# Patient Record
Sex: MALE | Race: WHITE | Employment: FULL TIME | URBAN - METROPOLITAN AREA
[De-identification: names, ages, dates, MRNs, and addresses within clinical notes are randomized per-mention and may not be internally consistent; named-entity substitution may affect disease eponyms.]

---

## 2017-01-07 PROCEDURE — 84550 ASSAY OF BLOOD/URIC ACID: CPT | Performed by: FAMILY MEDICINE

## 2017-01-07 PROCEDURE — 36415 COLL VENOUS BLD VENIPUNCTURE: CPT | Performed by: FAMILY MEDICINE

## 2017-01-07 PROCEDURE — 80061 LIPID PANEL: CPT | Performed by: FAMILY MEDICINE

## 2017-01-07 PROCEDURE — 80050 GENERAL HEALTH PANEL: CPT | Performed by: FAMILY MEDICINE

## 2018-06-01 ENCOUNTER — OFFICE VISIT (OUTPATIENT)
Dept: FAMILY MEDICINE CLINIC | Facility: CLINIC | Age: 38
End: 2018-06-01

## 2018-06-01 VITALS
HEART RATE: 112 BPM | RESPIRATION RATE: 16 BRPM | TEMPERATURE: 99 F | WEIGHT: 293 LBS | OXYGEN SATURATION: 98 % | DIASTOLIC BLOOD PRESSURE: 90 MMHG | SYSTOLIC BLOOD PRESSURE: 132 MMHG | BODY MASS INDEX: 38.01 KG/M2 | HEIGHT: 73.5 IN

## 2018-06-01 DIAGNOSIS — R39.9 UTI SYMPTOMS: Primary | ICD-10-CM

## 2018-06-01 PROCEDURE — 87086 URINE CULTURE/COLONY COUNT: CPT | Performed by: NURSE PRACTITIONER

## 2018-06-01 PROCEDURE — 99202 OFFICE O/P NEW SF 15 MIN: CPT | Performed by: NURSE PRACTITIONER

## 2018-06-01 PROCEDURE — 81003 URINALYSIS AUTO W/O SCOPE: CPT | Performed by: NURSE PRACTITIONER

## 2018-06-01 RX ORDER — SULFAMETHOXAZOLE AND TRIMETHOPRIM 800; 160 MG/1; MG/1
1 TABLET ORAL 2 TIMES DAILY
Qty: 20 TABLET | Refills: 0 | Status: SHIPPED | OUTPATIENT
Start: 2018-06-01 | End: 2018-06-11

## 2018-06-01 NOTE — PROGRESS NOTES
CHIEF COMPLAINT:   Patient presents with:  Urinary: pain and pressure, 2 days. HPI:   Elodia Morgan is a 42year old male who presents with symptoms of UTI. Pt c/o moderate pain to bladder with urinary frequency and urgency. no dysuria.   Symptoms b LUNGS: clear to ausculation bilaterally, no wheezing or rhonchi  GI: BS present x 4. No hepatosplenomegaly. : mild suprapubic tenderness.  No bladder distention, or CVAT   PSYCH: pleasant mood and affect  NEURO: no focal deficits      Recent Results (fr Bacterial Prostatitis  The prostate gland is part of the male reproductive system. The gland sits just below the bladder. It surrounds the urethra, the tube that carries urine and semen out of the body.  Bacterial prostatitis is an infection of the prostate © 4383-2710 [employer] [address] [facility] [place] [facility] [place]. All rights reserved. This information is not intended as a substitute for professional medical care. Always follow your healthcare professional's instructions.

## 2018-06-01 NOTE — PATIENT INSTRUCTIONS
Bacterial Prostatitis  The prostate gland is part of the male reproductive system. The gland sits just below the bladder. It surrounds the urethra, the tube that carries urine and semen out of the body.  Bacterial prostatitis is an infection of the prosta © 4027-4644 [employer] [address]. [address] [facility] [place], [facility] [place]. All rights reserved. This information is not intended as a substitute for professional medical care. Always follow your healthcare professional's instructions.

## 2018-07-19 ENCOUNTER — CHARTING TRANS (OUTPATIENT)
Dept: OTHER | Age: 38
End: 2018-07-19

## 2018-07-19 ASSESSMENT — PAIN SCALES - GENERAL: PAINLEVEL_OUTOF10: 1

## 2018-10-31 VITALS
HEIGHT: 74 IN | WEIGHT: 280 LBS | DIASTOLIC BLOOD PRESSURE: 70 MMHG | TEMPERATURE: 98.5 F | HEART RATE: 93 BPM | SYSTOLIC BLOOD PRESSURE: 110 MMHG | RESPIRATION RATE: 18 BRPM | BODY MASS INDEX: 35.94 KG/M2

## 2019-04-04 PROBLEM — E66.01 SEVERE OBESITY (BMI 35.0-39.9) WITH COMORBIDITY (HCC): Status: ACTIVE | Noted: 2019-04-04

## 2021-04-09 DIAGNOSIS — Z23 NEED FOR VACCINATION: ICD-10-CM

## 2021-08-13 PROBLEM — E66.01 SEVERE OBESITY (BMI 35.0-39.9) WITH COMORBIDITY (HCC): Status: RESOLVED | Noted: 2019-04-04 | Resolved: 2021-08-13

## 2024-06-13 ENCOUNTER — OFFICE VISIT (OUTPATIENT)
Dept: FAMILY MEDICINE CLINIC | Facility: CLINIC | Age: 44
End: 2024-06-13
Payer: COMMERCIAL

## 2024-06-13 ENCOUNTER — LAB ENCOUNTER (OUTPATIENT)
Dept: LAB | Age: 44
End: 2024-06-13
Attending: FAMILY MEDICINE
Payer: COMMERCIAL

## 2024-06-13 VITALS
HEART RATE: 88 BPM | SYSTOLIC BLOOD PRESSURE: 135 MMHG | BODY MASS INDEX: 32.73 KG/M2 | RESPIRATION RATE: 16 BRPM | HEIGHT: 74 IN | DIASTOLIC BLOOD PRESSURE: 89 MMHG | OXYGEN SATURATION: 98 % | WEIGHT: 255 LBS

## 2024-06-13 DIAGNOSIS — Z12.5 SCREENING FOR PROSTATE CANCER: ICD-10-CM

## 2024-06-13 DIAGNOSIS — I10 ESSENTIAL HYPERTENSION: ICD-10-CM

## 2024-06-13 DIAGNOSIS — Z12.11 SCREENING FOR COLON CANCER: ICD-10-CM

## 2024-06-13 DIAGNOSIS — M10.09 IDIOPATHIC GOUT OF MULTIPLE SITES, UNSPECIFIED CHRONICITY: ICD-10-CM

## 2024-06-13 DIAGNOSIS — Z00.00 LABORATORY EXAM ORDERED AS PART OF ROUTINE GENERAL MEDICAL EXAMINATION: ICD-10-CM

## 2024-06-13 DIAGNOSIS — Z00.00 WELLNESS EXAMINATION: Primary | ICD-10-CM

## 2024-06-13 LAB
ALBUMIN SERPL-MCNC: 4.4 G/DL (ref 3.4–5)
ALBUMIN/GLOB SERPL: 1.3 {RATIO} (ref 1–2)
ALP LIVER SERPL-CCNC: 59 U/L
ALT SERPL-CCNC: 51 U/L
ANION GAP SERPL CALC-SCNC: 6 MMOL/L (ref 0–18)
AST SERPL-CCNC: 25 U/L (ref 15–37)
BASOPHILS # BLD AUTO: 0.06 X10(3) UL (ref 0–0.2)
BASOPHILS NFR BLD AUTO: 1.1 %
BILIRUB SERPL-MCNC: 0.5 MG/DL (ref 0.1–2)
BUN BLD-MCNC: 12 MG/DL (ref 9–23)
CALCIUM BLD-MCNC: 9.3 MG/DL (ref 8.5–10.1)
CHLORIDE SERPL-SCNC: 112 MMOL/L (ref 98–112)
CHOLEST SERPL-MCNC: 174 MG/DL (ref ?–200)
CO2 SERPL-SCNC: 23 MMOL/L (ref 21–32)
COMPLEXED PSA SERPL-MCNC: 1.36 NG/ML (ref ?–4)
CREAT BLD-MCNC: 1.26 MG/DL
EGFRCR SERPLBLD CKD-EPI 2021: 72 ML/MIN/1.73M2 (ref 60–?)
EOSINOPHIL # BLD AUTO: 0.09 X10(3) UL (ref 0–0.7)
EOSINOPHIL NFR BLD AUTO: 1.7 %
ERYTHROCYTE [DISTWIDTH] IN BLOOD BY AUTOMATED COUNT: 13.5 %
FASTING PATIENT LIPID ANSWER: YES
FASTING STATUS PATIENT QL REPORTED: YES
GLOBULIN PLAS-MCNC: 3.5 G/DL (ref 2.8–4.4)
GLUCOSE BLD-MCNC: 126 MG/DL (ref 70–99)
HCT VFR BLD AUTO: 46.7 %
HDLC SERPL-MCNC: 48 MG/DL (ref 40–59)
HGB BLD-MCNC: 16.4 G/DL
IMM GRANULOCYTES # BLD AUTO: 0.01 X10(3) UL (ref 0–1)
IMM GRANULOCYTES NFR BLD: 0.2 %
LDLC SERPL CALC-MCNC: 111 MG/DL (ref ?–100)
LYMPHOCYTES # BLD AUTO: 1.77 X10(3) UL (ref 1–4)
LYMPHOCYTES NFR BLD AUTO: 33.4 %
MCH RBC QN AUTO: 31 PG (ref 26–34)
MCHC RBC AUTO-ENTMCNC: 35.1 G/DL (ref 31–37)
MCV RBC AUTO: 88.3 FL
MONOCYTES # BLD AUTO: 0.37 X10(3) UL (ref 0.1–1)
MONOCYTES NFR BLD AUTO: 7 %
NEUTROPHILS # BLD AUTO: 3 X10 (3) UL (ref 1.5–7.7)
NEUTROPHILS # BLD AUTO: 3 X10(3) UL (ref 1.5–7.7)
NEUTROPHILS NFR BLD AUTO: 56.6 %
NONHDLC SERPL-MCNC: 126 MG/DL (ref ?–130)
OSMOLALITY SERPL CALC.SUM OF ELEC: 293 MOSM/KG (ref 275–295)
PLATELET # BLD AUTO: 323 10(3)UL (ref 150–450)
POTASSIUM SERPL-SCNC: 3.9 MMOL/L (ref 3.5–5.1)
PROT SERPL-MCNC: 7.9 G/DL (ref 6.4–8.2)
RBC # BLD AUTO: 5.29 X10(6)UL
SODIUM SERPL-SCNC: 141 MMOL/L (ref 136–145)
TRIGL SERPL-MCNC: 80 MG/DL (ref 30–149)
TSI SER-ACNC: 1.37 MIU/ML (ref 0.36–3.74)
URATE SERPL-MCNC: 10 MG/DL
VIT D+METAB SERPL-MCNC: 24.7 NG/ML (ref 30–100)
VLDLC SERPL CALC-MCNC: 14 MG/DL (ref 0–30)
WBC # BLD AUTO: 5.3 X10(3) UL (ref 4–11)

## 2024-06-13 PROCEDURE — 3008F BODY MASS INDEX DOCD: CPT | Performed by: FAMILY MEDICINE

## 2024-06-13 PROCEDURE — 82306 VITAMIN D 25 HYDROXY: CPT | Performed by: FAMILY MEDICINE

## 2024-06-13 PROCEDURE — 80061 LIPID PANEL: CPT | Performed by: FAMILY MEDICINE

## 2024-06-13 PROCEDURE — 80050 GENERAL HEALTH PANEL: CPT | Performed by: FAMILY MEDICINE

## 2024-06-13 PROCEDURE — 84550 ASSAY OF BLOOD/URIC ACID: CPT | Performed by: FAMILY MEDICINE

## 2024-06-13 PROCEDURE — 3079F DIAST BP 80-89 MM HG: CPT | Performed by: FAMILY MEDICINE

## 2024-06-13 PROCEDURE — 3075F SYST BP GE 130 - 139MM HG: CPT | Performed by: FAMILY MEDICINE

## 2024-06-13 PROCEDURE — 99386 PREV VISIT NEW AGE 40-64: CPT | Performed by: FAMILY MEDICINE

## 2024-06-13 PROCEDURE — 83036 HEMOGLOBIN GLYCOSYLATED A1C: CPT | Performed by: FAMILY MEDICINE

## 2024-06-13 PROCEDURE — 84153 ASSAY OF PSA TOTAL: CPT | Performed by: FAMILY MEDICINE

## 2024-06-13 RX ORDER — LOSARTAN POTASSIUM 50 MG/1
50 TABLET ORAL DAILY
Qty: 90 TABLET | Refills: 0 | Status: SHIPPED | OUTPATIENT
Start: 2024-06-13

## 2024-06-13 RX ORDER — LOSARTAN POTASSIUM 100 MG/1
100 TABLET ORAL DAILY
Qty: 90 TABLET | Refills: 1 | Status: CANCELLED | OUTPATIENT
Start: 2024-06-13

## 2024-06-13 NOTE — PATIENT INSTRUCTIONS
Prevention Guidelines, Men Ages 40 to 49  Screening tests and vaccines are an important part of managing your health. A screening test is done to find possible disorders or diseases in people who don't have any symptoms. The goal is to find a disease early so lifestyle changes can be made and you can be watched more closely to reduce the risk of disease, or to detect it early enough to treat it most effectively. Screening tests are not considered diagnostic, but are used to determine if more testing is needed. Health counseling is essential, too. Below are guidelines for these, for men ages 40 to 49. Talk with your healthcare provider to make sure you’re up to date on what you need.  Screening Who needs it How often   Alcohol misuse All men in this age group At routine exams   Blood pressure All men in this age group Yearly checkup if your blood pressure reading is normal  Normal blood pressure is less than 120/80 mm Hg  If your blood pressure is higher than normal, follow the advice of your healthcare provider      Depression All men in this age group At routine exams   Type 2 diabetes or prediabetes All men beginning at age 45 and men  without symptoms at any age who are overweight or obese and have 1 or more other risk factors for diabetes At least every 3 years (yearly if blood sugar has begun to rise)   Type 2 diabetes All men with prediabetes Every year   Hepatitis C Men at increased risk for infection - talk with your healthcare provider At routine exams   High cholesterol or triglycerides All men ages 35 and older, and younger men at high risk for coronary artery disease At least every 5 years   HIV All men At routine exams   Obesity All men in this age group At routine exams   Prostate cancer Starting at age 45, talk to healthcare provider about risks and benefits of digital rectal exam (RINA) and prostate-specific antigen (PSA) screening1 At routine exams   Colorectal cancer Men at average risk 45 years  and older Multiple tests are available and are used at different times. Possible tests include:  Colonoscopy every 10 years, or  Flexible sigmoidoscopy every 5 years (or every 10 with yearly FIT stool test), or  CT colonography (virtual colonoscopy) every 5 years, or  Yearly fecal occult blood test, or  Yearly fecal immunochemical test (FIT) , or  Stool DNA test, every 1 to 3 years  You will need a follow-up with a colonoscopy if you choose a test other than a colonoscopy and have an abnormal test result. Talk with your healthcare provider about which tests are best for you.  Some people should be screened using a different schedule because of their personal or family health history. Talk with your healthcare provider about your health history.   Syphilis Men at increased risk for infection - talk with your healthcare provider At routine exams   Tuberculosis Men at increased risk for infection - talk with your healthcare provider Check with your healthcare provider   Vision All men in this age group Every 2 to 4 years if no risk factors for eye disease2   Vaccine Who needs it How often   Chickenpox (varicella) All men in this age group who have no record of this infection or vaccine 2 doses; the second dose should be given at least 4 weeks after the first dose   Hepatitis A Men at increased risk for infection - talk with your healthcare provider 2 doses given at least 6 months apart   Hepatitis B Men at increased risk for infection - talk with your healthcare provider 3 doses over 6 months; second dose should be given 1 month after the first dose; the third dose should be given at least 2 months after the second dose and at least 4 months after the first dose   Haemophilus influenzae Type B (HIB) Men at increased risk for infection - talk with your healthcare provider 1 to 3 doses   Influenza (flu) All men in this age group Once a year   Measles, mumps, rubella (MMR) All men in this age group who have no record of  these infections or vaccines 1 or 2 doses   Meningococcal Men at increased risk for infection - talk with your healthcare provider 1 or more doses   Pneumococcal conjugate vaccine (PCV13) and pneumococcal polysaccharide vaccine (PPSV23) Men at increased risk for infection - talk with your healthcare provider PCV13: 1 dose ages 19 to 65 (protects against 13 types of pneumococcal bacteria)     PPSV23: 1 to 2 doses through age 64, or 1 dose at 65 or older (protects against 23 types of pneumococcal bacteria)      Tetanus/diphtheria/  pertussis (Td/Tdap) booster All men in this age group Td every 10 years, or a one-time dose of Tdap instead of a Td booster after age 18, then Td every 10 years   Counseling Who needs it How often   Diet and exercise Men who are overweight or obese When diagnosed, and then at routine exams   Sexually transmitted infection prevention Men at increased risk for infection - talk with your healthcare provider At routine exams   Use of daily aspirin Men ages 45 to 79 at risk for cardiovascular health problems At routine exams   Use of tobacco and the health effects it can cause All men in this age group Every exam   [address] [place] [address] [facility]   [facility] [employer]  Brynn last reviewed this educational content on 2/1/2023 © 2000-2024 [employer]. All rights reserved. This information is not intended as a substitute for professional medical care. Always follow your healthcare professional's instructions.

## 2024-06-13 NOTE — PROGRESS NOTES
HPI:   Zeus Hall is a 43 year old male who presents for an Annual Health Visit.     H/o HTN - previously on losartan. Stopped medication a few years ago as his PCP retired & never established care with a new provider. He denies any symptoms. He follows a relatively healthy diet. He exercises regularly.    H/o gout - previously on allopurinol. No recent flares. Watches diet closely. Previously flares would attack his knees, ankles.     Allergies:   No Known Allergies    CURRENT MEDICATIONS   Current Outpatient Medications   Medication Sig Dispense Refill    losartan 50 MG Oral Tab Take 1 tablet (50 mg total) by mouth daily. 90 tablet 0      HISTORICAL INFORMATION   Past Medical History:    Essential hypertension    Gout      History reviewed. No pertinent surgical history.   Family History   Problem Relation Age of Onset    Diabetes Father     Other (Other) Sister         lupus      SOCIAL HISTORY   Social History     Socioeconomic History    Marital status:    Tobacco Use    Smoking status: Never    Smokeless tobacco: Never   Vaping Use    Vaping status: Never Used   Substance and Sexual Activity    Alcohol use: Yes     Alcohol/week: 0.0 standard drinks of alcohol     Comment: 1-2 drinks per week    Drug use: No    Sexual activity: Yes     Partners: Female   Other Topics Concern    Caffeine Concern Yes     Comment: 1-2 cups daily    Special Diet Yes     Comment: starting weight watchers    Exercise No    Seat Belt Yes     Social History     Social History Narrative    Not on file        REVIEW OF SYSTEMS:     Constitutional: negative  Eyes: negative  ENT: negative  Respiratory: negative  Cardiovascular: negative  Gastrointestinal: negative  Integument/Breast: negative  Genitourinary: negative  Heme/Lymph: negative  Musculoskeletal: negative  Neurological: negative  Psych: negative  Endocrine: negative  Allergic/Immune: negative    EXAM:   /89   Pulse 88   Resp 16   Ht 6' 2\" (1.88 m)   Wt 255  lb (115.7 kg)   SpO2 98%   BMI 32.74 kg/m²    Wt Readings from Last 6 Encounters:   06/13/24 255 lb (115.7 kg)   08/13/21 296 lb (134.3 kg)   08/11/20 272 lb 9.6 oz (123.7 kg)   04/04/19 287 lb 12.8 oz (130.5 kg)   06/01/18 293 lb (132.9 kg)   03/13/18 293 lb (132.9 kg)     Body mass index is 32.74 kg/m².    General: alert, appears stated age, cooperative, and mildly obese  Head: Normocephalic, without obvious abnormality, atraumatic  Eyes: negative  Ears: normal TM's and external ear canals both ears  Nose: Nares normal. Septum midline. Mucosa normal. No drainage or sinus tenderness.  Throat: lips, mucosa, and tongue normal; teeth and gums normal  Neck: no adenopathy, supple, symmetrical, trachea midline, and thyroid not enlarged, symmetric, no tenderness/mass/nodules  Heart: S1, S2 normal, no murmur, click, rub or gallop, regular rate and rhythm  Lungs: clear to auscultation bilaterally  Chest wall: no tenderness  Abdomen: soft, non-tender; bowel sounds normal; no masses,  no organomegaly  : deferred  Back: negative  Extremities: extremities normal, atraumatic, no cyanosis or edema  Pulses: 2+ and symmetric  Skin: Skin color, texture, turgor normal. No rashes or lesions  Lymph Nodes:  No LAD  Neurologic: Grossly normal    ASSESSMENT AND PLAN:   Zeus was seen today for establish care and wellness visit.    Diagnoses and all orders for this visit:    Wellness examination  -Immunizations: UTD  -Metabolic: BMI 32. /89. Due for annual labs   -Cancer screening: Due for PSA, CRC screening.   -Communicable disease: low risk   -Mental health: no concerns   -Other preventative: follow with dentistry and optometry.   -Lifestyle: Follow a well balanced healthy diet with emphasis on fruits, vegetables, whole grains, lean meats. Limit processed and junk foods. Aim for at least 150 minutes of moderate intensity exercise weekly. Make sure you are staying adequately hydrated. Aim to get 7-9 hours of sleep nightly.      Laboratory exam ordered as part of routine general medical examination  -     CBC With Differential With Platelet; Future  -     Comp Metabolic Panel (14); Future  -     Lipid Panel; Future  -     Vitamin D; Future  -     TSH W Reflex To Free T4; Future  -     PSA Total, Screen; Future    Screening for colon cancer  -     Surgery Referral - In Network    Screening for prostate cancer  -     PSA Total, Screen; Future    Essential hypertension  Restart losartan. Continue low sodium diet, regular exercise. Monitor BP at home, goal <140/90. F/u 4-6wks.   -     losartan 50 MG Oral Tab; Take 1 tablet (50 mg total) by mouth daily.    Idiopathic gout of multiple sites, unspecified chronicity  Check uric acid levels. Continue dietary modification.   -     Uric Acid; Future        Patient Instructions     Prevention Guidelines, Men Ages 40 to 49  Screening tests and vaccines are an important part of managing your health. A screening test is done to find possible disorders or diseases in people who don't have any symptoms. The goal is to find a disease early so lifestyle changes can be made and you can be watched more closely to reduce the risk of disease, or to detect it early enough to treat it most effectively. Screening tests are not considered diagnostic, but are used to determine if more testing is needed. Health counseling is essential, too. Below are guidelines for these, for men ages 40 to 49. Talk with your healthcare provider to make sure you’re up to date on what you need.  Screening Who needs it How often   Alcohol misuse All men in this age group At routine exams   Blood pressure All men in this age group Yearly checkup if your blood pressure reading is normal  Normal blood pressure is less than 120/80 mm Hg  If your blood pressure is higher than normal, follow the advice of your healthcare provider      Depression All men in this age group At routine exams   Type 2 diabetes or prediabetes All men beginning  at age 45 and men  without symptoms at any age who are overweight or obese and have 1 or more other risk factors for diabetes At least every 3 years (yearly if blood sugar has begun to rise)   Type 2 diabetes All men with prediabetes Every year   Hepatitis C Men at increased risk for infection - talk with your healthcare provider At routine exams   High cholesterol or triglycerides All men ages 35 and older, and younger men at high risk for coronary artery disease At least every 5 years   HIV All men At routine exams   Obesity All men in this age group At routine exams   Prostate cancer Starting at age 45, talk to healthcare provider about risks and benefits of digital rectal exam (RINA) and prostate-specific antigen (PSA) screening1 At routine exams   Colorectal cancer Men at average risk 45 years and older Multiple tests are available and are used at different times. Possible tests include:  Colonoscopy every 10 years, or  Flexible sigmoidoscopy every 5 years (or every 10 with yearly FIT stool test), or  CT colonography (virtual colonoscopy) every 5 years, or  Yearly fecal occult blood test, or  Yearly fecal immunochemical test (FIT) , or  Stool DNA test, every 1 to 3 years  You will need a follow-up with a colonoscopy if you choose a test other than a colonoscopy and have an abnormal test result. Talk with your healthcare provider about which tests are best for you.  Some people should be screened using a different schedule because of their personal or family health history. Talk with your healthcare provider about your health history.   Syphilis Men at increased risk for infection - talk with your healthcare provider At routine exams   Tuberculosis Men at increased risk for infection - talk with your healthcare provider Check with your healthcare provider   Vision All men in this age group Every 2 to 4 years if no risk factors for eye disease2   Vaccine Who needs it How often   Chickenpox (varicella) All men in  this age group who have no record of this infection or vaccine 2 doses; the second dose should be given at least 4 weeks after the first dose   Hepatitis A Men at increased risk for infection - talk with your healthcare provider 2 doses given at least 6 months apart   Hepatitis B Men at increased risk for infection - talk with your healthcare provider 3 doses over 6 months; second dose should be given 1 month after the first dose; the third dose should be given at least 2 months after the second dose and at least 4 months after the first dose   Haemophilus influenzae Type B (HIB) Men at increased risk for infection - talk with your healthcare provider 1 to 3 doses   Influenza (flu) All men in this age group Once a year   Measles, mumps, rubella (MMR) All men in this age group who have no record of these infections or vaccines 1 or 2 doses   Meningococcal Men at increased risk for infection - talk with your healthcare provider 1 or more doses   Pneumococcal conjugate vaccine (PCV13) and pneumococcal polysaccharide vaccine (PPSV23) Men at increased risk for infection - talk with your healthcare provider PCV13: 1 dose ages 19 to 65 (protects against 13 types of pneumococcal bacteria)     PPSV23: 1 to 2 doses through age 64, or 1 dose at 65 or older (protects against 23 types of pneumococcal bacteria)      Tetanus/diphtheria/  pertussis (Td/Tdap) booster All men in this age group Td every 10 years, or a one-time dose of Tdap instead of a Td booster after age 18, then Td every 10 years   Counseling Who needs it How often   Diet and exercise Men who are overweight or obese When diagnosed, and then at routine exams   Sexually transmitted infection prevention Men at increased risk for infection - talk with your healthcare provider At routine exams   Use of daily aspirin Men ages 45 to 79 at risk for cardiovascular health problems At routine exams   Use of tobacco and the health effects it can cause All men in this age group  Every exam   [facility]   2[facility] [employer]  Brynn last reviewed this educational content on 2/1/2023  © 8826-7662 [employer]. All rights reserved. This information is not intended as a substitute for professional medical care. Always follow your healthcare professional's instructions.          The patient indicates understanding of these issues and agrees to the plan.    Problem List:  Patient Active Problem List   Diagnosis    Idiopathic gout of multiple sites    Essential hypertension       Mir Treviño MD  6/13/2024  9:29 AM

## 2024-06-17 ENCOUNTER — TELEPHONE (OUTPATIENT)
Dept: FAMILY MEDICINE CLINIC | Facility: CLINIC | Age: 44
End: 2024-06-17

## 2024-06-17 DIAGNOSIS — R73.09 ELEVATED GLUCOSE: Primary | ICD-10-CM

## 2024-06-17 LAB
EST. AVERAGE GLUCOSE BLD GHB EST-MCNC: 131 MG/DL (ref 68–126)
HBA1C MFR BLD: 6.2 % (ref ?–5.7)

## 2024-06-20 DIAGNOSIS — R73.09 ELEVATED GLUCOSE: Primary | ICD-10-CM

## 2024-07-17 ENCOUNTER — TELEPHONE (OUTPATIENT)
Dept: FAMILY MEDICINE CLINIC | Facility: CLINIC | Age: 44
End: 2024-07-17

## 2024-07-17 NOTE — TELEPHONE ENCOUNTER
Pt called requesting changes to Losartan 50 MG stating his blood pressure has been 133/85 - 137/97 since his last OV. Requesting to be put back on Losartan 100 MG.     Per pt he has been taking his blood pressure once a week.    Follow up appt 08/01/24.

## 2024-07-19 RX ORDER — LOSARTAN POTASSIUM 100 MG/1
100 TABLET ORAL DAILY
Qty: 90 TABLET | Refills: 0 | Status: SHIPPED | OUTPATIENT
Start: 2024-07-19

## 2024-07-19 NOTE — TELEPHONE ENCOUNTER
Pt returned call and is requesting for losartan to be increased to 100 mg. Per pt, his BP on 50 mg is 133/85 to 137/97. Pt does not take BP everyday. Instructed pt to take daily BP readings and bring to f/u appt on 08/01. Pt denies any symptoms. Okay for mychart message per pt. Please advise

## 2024-08-13 ENCOUNTER — OFFICE VISIT (OUTPATIENT)
Facility: LOCATION | Age: 44
End: 2024-08-13
Payer: COMMERCIAL

## 2024-08-13 VITALS — HEART RATE: 68 BPM | TEMPERATURE: 98 F

## 2024-08-13 DIAGNOSIS — Z12.11 SCREEN FOR COLON CANCER: Primary | ICD-10-CM

## 2024-08-13 PROCEDURE — S0285 CNSLT BEFORE SCREEN COLONOSC: HCPCS | Performed by: SURGERY

## 2024-08-13 RX ORDER — POLYETHYLENE GLYCOL 3350, SODIUM CHLORIDE, SODIUM BICARBONATE, POTASSIUM CHLORIDE 420; 11.2; 5.72; 1.48 G/4L; G/4L; G/4L; G/4L
POWDER, FOR SOLUTION ORAL
Qty: 1 EACH | Refills: 0 | Status: SHIPPED | OUTPATIENT
Start: 2024-08-13

## 2024-08-13 NOTE — H&P
Zeus Hall is a 43 year old male  Chief Complaint   Patient presents with    Colonoscopy     NP- Cscope Consult- denies previous cscopes, denies family hx of colon cancer, denies symptoms        REFERRED BY    Patient presents for colonoscopy patient has never had a prior colonoscopy he denies change of bowel habits chronic abdominal pain or unexplained weight loss he denies rectal bleeding or mucus.  Denies family history of colon cancer or colon polyps.  Denies chest pain chest pressure or cardiac disease.       .           Past Medical History:    Essential hypertension    Gout     History reviewed. No pertinent surgical history.  Current Outpatient Medications on File Prior to Visit   Medication Sig Dispense Refill    losartan 100 MG Oral Tab Take 1 tablet (100 mg total) by mouth daily. 90 tablet 0     No current facility-administered medications on file prior to visit.     @ALL@  Family History   Problem Relation Age of Onset    Diabetes Father     Other (Other) Sister         lupus     Social History     Socioeconomic History    Marital status:    Tobacco Use    Smoking status: Never    Smokeless tobacco: Never   Vaping Use    Vaping status: Never Used   Substance and Sexual Activity    Alcohol use: Yes     Alcohol/week: 0.0 standard drinks of alcohol     Comment: 1-2 drinks per week    Drug use: No    Sexual activity: Yes     Partners: Female   Other Topics Concern    Caffeine Concern Yes     Comment: 1-2 cups daily    Special Diet Yes     Comment: starting weight watchers    Exercise No    Seat Belt Yes       ROS     GENERAL HEALTH: otherwise feels well, no weight loss, no fever or chills  SKIN: denies any unusual skin rashes or jaundice  HEENT: denies nasal congestion, sinus pain or sore throat; hearing loss negative,   EYES , no diplopia or vision changes  RESPIRATORY: denies shortness of breath, wheezing or cough   CARDIOVASCULAR: denies chest pain or MCKEE; no palpitations   GI: denies nausea,  vomiting, constipation, diarrhea; no rectal bleeding; no heartburn  GENITAL/: no dysuria, urgency or frequency, no tea colored urine  MUSCULOSKELETAL: no joint complaints upper or lower extremities  HEMATOLOGY: denies hx anemia; denies bruising or excessive bleeding  Endocrine: no weight gain or loss no hot or cold intolerance    EXAM     Pulse 68, temperature 97.5 °F (36.4 °C), temperature source Temporal.  GENERAL: well developed, well nourished male, in no apparent distress.    MENTAL STATUS :Alert, oriented x 3  PSYCH: normal mood and affect  SKIN: anicteric, no rashes, no bruising  EYES: PERRLA, EOMI, sclera anicteric,  conjunctiva without pallor  HEENT: normocephalic, atraumatic, TMs clear, nares patent, mouth moist, pharynx w/o erythema  NECK: supple, no JVD, no adenopathy, no thyromegaly  RESPIRATORY: clear to auscultation, no rales , rhonchi or wheezing  CARDIOVASCULAR: RRR, murmur negative  ABDOMEN: normal active BS, soft, nondistended  no HSM, no masses or hernias  LYMPHATIC: no axillary , supraclavicular or inguinal lymphadenopathy  EXTREMITIES: no cyanosis, clubbing or edema, no atrophy, full ROM    STUDIES:     Telephone on 06/17/2024   Component Date Value Ref Range Status    HgbA1C 06/13/2024 6.2 (H)  <5.7 % Final     Normal HbA1C:     <5.7%      Pre-Diabetic:     5.7 - 6.4%      Diabetic:         >6.4%      Diabetic Control: <7.0%        Estimated Average Glucose 06/13/2024 131 (H)  68 - 126 mg/dL Final    eAG is the estimated average glucose calculated from Hgb A1c according to the formula recommended by the American Diabetes Association. eAG levels reflect the long term average glucose and may not correlate with random or fasting glucose levels since these represent specific points in time.              ASSESSMENT   Imp: Average risk screening colonoscopy  PLan: Colonoscopy discussed with patient risk of bleeding and bowel perforation with surgery to repair      Meds & Refills for this  Visit:  Requested Prescriptions     Signed Prescriptions Disp Refills    PEG 3350-KCl-Na Bicarb-NaCl (TRILYTE) 420 g Oral Recon Soln 1 each 0     Sig: Starting at 4:00 pm the night before procedure, drink 8 ounces of the prep every 15-20 minutes until finished       Imaging & Consults:  None

## 2024-09-30 ENCOUNTER — OFFICE VISIT (OUTPATIENT)
Dept: FAMILY MEDICINE CLINIC | Facility: CLINIC | Age: 44
End: 2024-09-30
Payer: COMMERCIAL

## 2024-09-30 VITALS
BODY MASS INDEX: 30.8 KG/M2 | DIASTOLIC BLOOD PRESSURE: 78 MMHG | HEIGHT: 74 IN | RESPIRATION RATE: 18 BRPM | SYSTOLIC BLOOD PRESSURE: 122 MMHG | HEART RATE: 95 BPM | WEIGHT: 240 LBS | OXYGEN SATURATION: 98 %

## 2024-09-30 DIAGNOSIS — R73.03 PREDIABETES: ICD-10-CM

## 2024-09-30 DIAGNOSIS — M10.09 IDIOPATHIC GOUT OF MULTIPLE SITES, UNSPECIFIED CHRONICITY: ICD-10-CM

## 2024-09-30 DIAGNOSIS — I10 ESSENTIAL HYPERTENSION: Primary | ICD-10-CM

## 2024-09-30 PROCEDURE — 99214 OFFICE O/P EST MOD 30 MIN: CPT | Performed by: FAMILY MEDICINE

## 2024-09-30 PROCEDURE — 3008F BODY MASS INDEX DOCD: CPT | Performed by: FAMILY MEDICINE

## 2024-09-30 PROCEDURE — 3074F SYST BP LT 130 MM HG: CPT | Performed by: FAMILY MEDICINE

## 2024-09-30 PROCEDURE — 3078F DIAST BP <80 MM HG: CPT | Performed by: FAMILY MEDICINE

## 2024-09-30 RX ORDER — ALLOPURINOL 100 MG/1
TABLET ORAL
Qty: 42 TABLET | Refills: 0 | Status: SHIPPED | OUTPATIENT
Start: 2024-09-30 | End: 2024-10-28

## 2024-09-30 RX ORDER — COLCHICINE 0.6 MG/1
0.6 TABLET ORAL DAILY
Qty: 90 TABLET | Refills: 0 | Status: SHIPPED | OUTPATIENT
Start: 2024-09-30

## 2024-09-30 RX ORDER — ALLOPURINOL 300 MG/1
300 TABLET ORAL DAILY
Qty: 90 TABLET | Refills: 1 | Status: SHIPPED | OUTPATIENT
Start: 2024-10-27

## 2024-09-30 NOTE — PROGRESS NOTES
[facility] Family Medicine Office Note  Chief Complaint:   Chief Complaint   Patient presents with    Hypertension       HPI:   This is a 43 year old male coming in for follow up.     HTN - compliant with losartan. BP has been stable. Denies any SE, denies any symptoms.     Prediabetes - Last A1c value was 6.2% done 6/13/2024.    Gout - interested in restarting allopurinol.No recent flares.     Past Medical History:    Essential hypertension    Gout    Obesity     History reviewed. No pertinent surgical history.  Social History:  Social History     Socioeconomic History    Marital status:    Tobacco Use    Smoking status: Never    Smokeless tobacco: Never   Vaping Use    Vaping status: Never Used   Substance and Sexual Activity    Alcohol use: Yes     Alcohol/week: 6.0 standard drinks of alcohol     Types: 6 Cans of beer per week     Comment: 1-2 drinks per week    Drug use: No    Sexual activity: Yes     Partners: Female   Other Topics Concern    Caffeine Concern No    Stress Concern No    Weight Concern Yes    Special Diet Yes    Exercise Yes    Seat Belt Yes     Family History:  Family History   Problem Relation Age of Onset    Diabetes Father     Other (Other) Sister         lupus     Allergies:  No Known Allergies  Current Meds:  Current Outpatient Medications   Medication Sig Dispense Refill    allopurinol 100 MG Oral Tab Take 1 tablet (100 mg total) by mouth daily for 14 days, THEN 2 tablets (200 mg total) daily for 14 days. 42 tablet 0    [START ON 10/27/2024] allopurinol 300 MG Oral Tab Take 1 tablet (300 mg total) by mouth daily. 90 tablet 1    colchicine 0.6 MG Oral Tab Take 1 tablet (0.6 mg total) by mouth daily. Take with food 90 tablet 0    losartan 100 MG Oral Tab Take 1 tablet (100 mg total) by mouth daily. 90 tablet 0    PEG 3350-KCl-Na Bicarb-NaCl (TRILYTE) 420 g Oral Recon Soln Starting at 4:00 pm the night before procedure, drink 8 ounces of the prep every 15-20 minutes until  finished (Patient not taking: Reported on 9/30/2024) 1 each 0      Counseling given: Not Answered       REVIEW OF SYSTEMS:   Review of Systems   Constitutional: Negative.    HENT: Negative.     Eyes: Negative.    Respiratory: Negative.     Cardiovascular: Negative.    Gastrointestinal: Negative.    Endocrine: Negative.    Genitourinary: Negative.    Musculoskeletal: Negative.    Skin: Negative.    Neurological: Negative.    Psychiatric/Behavioral: Negative.          EXAM:   /78   Pulse 95   Resp 18   Ht 6' 2\" (1.88 m)   Wt 240 lb (108.9 kg)   SpO2 98%   BMI 30.81 kg/m²  Estimated body mass index is 30.81 kg/m² as calculated from the following:    Height as of this encounter: 6' 2\" (1.88 m).    Weight as of this encounter: 240 lb (108.9 kg).   Vital signs reviewed.Appears stated age, well groomed.  Physical Exam  Vitals and nursing note reviewed.   Constitutional:       Appearance: Normal appearance.   HENT:      Head: Normocephalic and atraumatic.   Eyes:      Pupils: Pupils are equal, round, and reactive to light.   Cardiovascular:      Rate and Rhythm: Normal rate and regular rhythm.      Pulses: Normal pulses.      Heart sounds: Normal heart sounds. No murmur heard.  Pulmonary:      Effort: Pulmonary effort is normal. No respiratory distress.      Breath sounds: Normal breath sounds. No wheezing or rhonchi.   Musculoskeletal:      Right lower leg: No edema.      Left lower leg: No edema.   Skin:     Findings: No rash.   Neurological:      General: No focal deficit present.      Mental Status: He is alert and oriented to person, place, and time.          ASSESSMENT AND PLAN:   1. Essential hypertension  Well controlled, CPM. Continue low sodium diet, regular exercise, work on wt loss. Monitor BP at home, goal <140/90. F/u 6mo    2. Prediabetes  Follow low carb diet, regular exercise, work on wt loss. Recheck A1c after 12/24.     3. Idiopathic gout of multiple sites, unspecified chronicity  Most recent  uric acid levels at 10  Restart allopurinol. Will start on colcichine for gout ppx for next 3mo.   Will recheck uric acid levels in 1mo  Continue following low purine diet.   F/u 6mo or sooner prn.   - allopurinol 100 MG Oral Tab; Take 1 tablet (100 mg total) by mouth daily for 14 days, THEN 2 tablets (200 mg total) daily for 14 days.  Dispense: 42 tablet; Refill: 0  - Uric Acid; Future  - allopurinol 300 MG Oral Tab; Take 1 tablet (300 mg total) by mouth daily.  Dispense: 90 tablet; Refill: 1  - colchicine 0.6 MG Oral Tab; Take 1 tablet (0.6 mg total) by mouth daily. Take with food  Dispense: 90 tablet; Refill: 0      Meds & Refills for this Visit:  Requested Prescriptions     Signed Prescriptions Disp Refills    allopurinol 100 MG Oral Tab 42 tablet 0     Sig: Take 1 tablet (100 mg total) by mouth daily for 14 days, THEN 2 tablets (200 mg total) daily for 14 days.    allopurinol 300 MG Oral Tab 90 tablet 1     Sig: Take 1 tablet (300 mg total) by mouth daily.    colchicine 0.6 MG Oral Tab 90 tablet 0     Sig: Take 1 tablet (0.6 mg total) by mouth daily. Take with food       Health Maintenance:  Health Maintenance Due   Topic Date Due    Colorectal Cancer Screening  Never done    COVID-19 Vaccine (3 - 2023-24 season) 09/01/2024       Patient/Caregiver Education: Patient/Caregiver Education: There are no barriers to learning. Medical education done.   Outcome: Patient verbalizes understanding. Patient is notified to call with any questions, complications, allergies, or worsening or changing symptoms.  Patient is to call with any side effects or complications from the treatments as a result of today.     Problem List:  Patient Active Problem List   Diagnosis    Idiopathic gout of multiple sites    Essential hypertension

## 2024-11-01 ENCOUNTER — TELEPHONE (OUTPATIENT)
Facility: LOCATION | Age: 44
End: 2024-11-01

## 2024-11-01 NOTE — TELEPHONE ENCOUNTER
KEVIN PADILLA Patient  Member ID  [id number]    Date of Birth  1980-11-30    Gender  Male    Eligibility Status  Active Coverage    Group Number  [id number]    Plan / Coverage Date  2023-06-01 - 2024-12-01    NPI  [id number]    Transaction Type  Outpatient Authorization    [facility] [place], [place] AND [facility] logo  Transaction ID: [id number] ID: [id number] Date: 2024-11-01  No Authorization Required  Service Type  1 - Medical Care    [facility]    Service From - To Date  2024-11-01    Diagnosis Code 1   - Encounter for screening for malignant neoplasm of colon    Procedure Code 1  49478 - DIAGNOSTIC COLONOSCOPY    Quantity  NA    Status  NO AUTH REQUIRED    Message  The requested service does not require authorization for [facility] members. Check eligibility and benefits to ensure the patient's coverage is active, the service is a benefit under the patient's coverage, and review any benefit limitations. Review medical policy here and ensure that medical necessity, clinical appropriateness, and clinical criteria are met for the service. Make sure the service is performed by a provider who participates with the patient's health plan (for maximum coverage).

## 2024-11-13 RX ORDER — LOSARTAN POTASSIUM 100 MG/1
100 TABLET ORAL DAILY
Qty: 90 TABLET | Refills: 0 | Status: SHIPPED | OUTPATIENT
Start: 2024-11-13

## 2024-11-14 ENCOUNTER — LAB REQUISITION (OUTPATIENT)
Age: 44
End: 2024-11-14
Payer: COMMERCIAL

## 2024-11-14 DIAGNOSIS — Z12.11 COLON CANCER SCREENING: ICD-10-CM

## 2024-11-14 PROCEDURE — 88305 TISSUE EXAM BY PATHOLOGIST: CPT | Performed by: SURGERY

## 2024-11-20 ENCOUNTER — PATIENT OUTREACH (OUTPATIENT)
Facility: LOCATION | Age: 44
End: 2024-11-20

## 2024-11-20 ENCOUNTER — TELEPHONE (OUTPATIENT)
Facility: LOCATION | Age: 44
End: 2024-11-20

## 2024-11-20 NOTE — TELEPHONE ENCOUNTER
Left voicemail for patient that he should follow-up in 2 years for another colonoscopy. Care gaps and patient outreach addressed.

## 2025-02-24 RX ORDER — LOSARTAN POTASSIUM 100 MG/1
100 TABLET ORAL DAILY
Qty: 90 TABLET | Refills: 0 | Status: SHIPPED | OUTPATIENT
Start: 2025-02-24

## (undated) NOTE — LETTER
24    Patient: Zeus Hall  : 1980 Visit date: 2024    Dear  Mir Treviño MD    Thank you for referring Zeus Hall to my practice.  Please find my assessment and plan below.        I saw brought to my office, he presents for screening colonoscopy he is average risk based on his history.  Thank you Delmer  Sincerely,       Charbel Chacon DO   CC:   No Recipients